# Patient Record
Sex: FEMALE | Race: WHITE
[De-identification: names, ages, dates, MRNs, and addresses within clinical notes are randomized per-mention and may not be internally consistent; named-entity substitution may affect disease eponyms.]

---

## 2017-01-19 ENCOUNTER — HOSPITAL ENCOUNTER (OUTPATIENT)
Dept: HOSPITAL 61 - PCVCCLINIC | Age: 57
Discharge: HOME | End: 2017-01-19
Attending: RADIOLOGY
Payer: MEDICARE

## 2017-01-19 DIAGNOSIS — E11.9: ICD-10-CM

## 2017-01-19 DIAGNOSIS — I25.5: ICD-10-CM

## 2017-01-19 DIAGNOSIS — I25.10: ICD-10-CM

## 2017-01-19 DIAGNOSIS — I48.91: ICD-10-CM

## 2017-01-19 DIAGNOSIS — I10: ICD-10-CM

## 2017-01-19 DIAGNOSIS — I73.9: Primary | ICD-10-CM

## 2017-01-19 DIAGNOSIS — E78.00: ICD-10-CM

## 2017-01-19 PROCEDURE — G0463 HOSPITAL OUTPT CLINIC VISIT: HCPCS

## 2017-01-26 ENCOUNTER — HOSPITAL ENCOUNTER (OUTPATIENT)
Dept: HOSPITAL 61 - PCVCINTER | Age: 57
Discharge: HOME | End: 2017-01-26
Attending: RADIOLOGY
Payer: MEDICARE

## 2017-01-26 DIAGNOSIS — E11.9: ICD-10-CM

## 2017-01-26 DIAGNOSIS — I70.92: ICD-10-CM

## 2017-01-26 DIAGNOSIS — E78.00: ICD-10-CM

## 2017-01-26 DIAGNOSIS — I25.5: ICD-10-CM

## 2017-01-26 DIAGNOSIS — I48.91: ICD-10-CM

## 2017-01-26 DIAGNOSIS — I70.0: ICD-10-CM

## 2017-01-26 DIAGNOSIS — I70.1: ICD-10-CM

## 2017-01-26 DIAGNOSIS — I25.10: ICD-10-CM

## 2017-01-26 DIAGNOSIS — F17.200: ICD-10-CM

## 2017-01-26 DIAGNOSIS — I70.213: Primary | ICD-10-CM

## 2017-01-26 DIAGNOSIS — I10: ICD-10-CM

## 2017-01-26 DIAGNOSIS — I15.0: ICD-10-CM

## 2017-01-26 PROCEDURE — C1894 INTRO/SHEATH, NON-LASER: HCPCS

## 2017-01-26 PROCEDURE — 75716 ARTERY X-RAYS ARMS/LEGS: CPT

## 2017-01-26 PROCEDURE — 36246 INS CATH ABD/L-EXT ART 2ND: CPT

## 2017-01-26 PROCEDURE — 36252 INS CATH REN ART 1ST BILAT: CPT

## 2017-01-26 PROCEDURE — 76937 US GUIDE VASCULAR ACCESS: CPT

## 2017-01-26 PROCEDURE — C1769 GUIDE WIRE: HCPCS

## 2017-01-26 PROCEDURE — C1760 CLOSURE DEV, VASC: HCPCS

## 2017-03-03 ENCOUNTER — HOSPITAL ENCOUNTER (OUTPATIENT)
Dept: HOSPITAL 61 - PCVCIMAG | Age: 57
Discharge: HOME | End: 2017-03-03
Attending: RADIOLOGY
Payer: MEDICARE

## 2017-03-03 DIAGNOSIS — E78.00: ICD-10-CM

## 2017-03-03 DIAGNOSIS — I70.211: Primary | ICD-10-CM

## 2017-03-03 DIAGNOSIS — I25.5: ICD-10-CM

## 2017-03-03 DIAGNOSIS — E11.9: ICD-10-CM

## 2017-03-03 DIAGNOSIS — I25.10: ICD-10-CM

## 2017-03-03 DIAGNOSIS — I48.91: ICD-10-CM

## 2017-03-03 DIAGNOSIS — I10: ICD-10-CM

## 2017-03-03 PROCEDURE — G0463 HOSPITAL OUTPT CLINIC VISIT: HCPCS

## 2017-03-03 PROCEDURE — 93926 LOWER EXTREMITY STUDY: CPT

## 2017-08-01 ENCOUNTER — HOSPITAL ENCOUNTER (OUTPATIENT)
Dept: HOSPITAL 61 - PCVCIMAG | Age: 57
Discharge: HOME | End: 2017-08-01
Attending: RADIOLOGY
Payer: MEDICARE

## 2017-08-01 DIAGNOSIS — Z95.1: ICD-10-CM

## 2017-08-01 DIAGNOSIS — I25.5: ICD-10-CM

## 2017-08-01 DIAGNOSIS — Z72.0: ICD-10-CM

## 2017-08-01 DIAGNOSIS — M19.90: ICD-10-CM

## 2017-08-01 DIAGNOSIS — Z79.4: ICD-10-CM

## 2017-08-01 DIAGNOSIS — Z79.82: ICD-10-CM

## 2017-08-01 DIAGNOSIS — E78.00: ICD-10-CM

## 2017-08-01 DIAGNOSIS — I48.91: ICD-10-CM

## 2017-08-01 DIAGNOSIS — I08.3: Primary | ICD-10-CM

## 2017-08-01 DIAGNOSIS — J44.9: ICD-10-CM

## 2017-08-01 DIAGNOSIS — E11.9: ICD-10-CM

## 2017-08-01 DIAGNOSIS — Z88.0: ICD-10-CM

## 2017-08-01 DIAGNOSIS — I73.9: ICD-10-CM

## 2017-08-01 DIAGNOSIS — I25.2: ICD-10-CM

## 2017-08-01 PROCEDURE — G0463 HOSPITAL OUTPT CLINIC VISIT: HCPCS

## 2017-08-01 PROCEDURE — 93306 TTE W/DOPPLER COMPLETE: CPT

## 2017-08-01 PROCEDURE — 93005 ELECTROCARDIOGRAM TRACING: CPT

## 2017-08-01 PROCEDURE — 80061 LIPID PANEL: CPT

## 2017-08-01 NOTE — PCVCIMAG
--------------- APPROVED REPORT --------------





Study performed:  08/01/2017 13:04:01



EXAM: Comprehensive 2D, Doppler, and color-flow 

Echocardiogram

Patient Location: Echo lab

Status:  routine



Other Information 

Study Quality: Adequate

Technically limited study due to  lung disease.



Risk Factors: 

Cardiac Risk Factors:  SOB, Smoking, 

HTN



Indications

COPD

CAD

Ischemic Cardiomyopathy



2D Dimensions

LVEF(%):  32.33 (&gt;50%)

IVSd:  9.68 (7-11mm)

LVDd:  50.74 mm

PWd:  6.30 (7-11mm)

LVDs:  42.94 (25-40mm)

Left Atrium:  50.49 (27-40mm)

Aortic Root:  28.18 mm

LV Single Plane 4CH:  57.52 %

LV Single Plane 2CH:  54.88 %Alonzo's LVEF:  56.20 %

Biplane EF:  59.0 %



Volumes

Left Atrial Volume (Systole)

Single Plane 4CH:  67.80 mLSingle Plane 2CH:  71.55 mL

LA ESV Index:  48.00 mL/m2



Aortic Valve

AoV Peak Ric.:  2.01 m/s

AO Peak Gr.:  16.47 mmHgLVOT Max PG:  3.90 mmHg

LVOT Max V:  0.99 m/s



Mitral Valve

E/A Ratio:  0.9

MV Decel. Time:  194.96 ms

MV E Max Ric.:  1.30 m/s

MV A Ric.:  1.44 m/s

MV PHT:  56.54 ms

MVA (PHT):  3.42 cm2

IVRT:  93.43 ms



Pulmonary Valve

PV Peak Ric.:  1.27 m/sPV Peak Gr.:  6.46 mmHg



Pulmonary Vein

P Vein S:    0.73 m/sP Vein A:  0.27 m/s

P Vein D:   0.92 m/sP Vein A Dur.:  100.3 msec

P Vein S/D Ratio:  0.79



Tricuspid Valve

TR Peak Ric.:  3.27 m/s

TR Peak Gr.:  42.71 mmHg



Left Ventricle

The left ventricle is normal size. There is normal LV segmental wall 

motion. There is normal left ventricular wall thickness. Left 

ventricular systolic function is normal. Fixed basal inferior 

akinesis from prior MI. LVEF is 50% Grade I - abnormal relaxation 

pattern.



Right Ventricle

The right ventricle is normal size. The right ventricular systolic 

function is normal.



Atria

Left atrium is moderate to severely dilated. Right atrium is 

moderately dilated.



Aortic Valve

The aortic valve is normal in structure. Mild aortic regurgitation. 

There is no aortic valvular stenosis.



Mitral Valve

Redundant, calcified mitral valve leaflets. Moderate eccentric mitral 

regurgitation. has incr from last exam No evidence of mitral valve 

stenosis.



Tricuspid Valve

The tricuspid valve is normal in structure. Moderate tricuspid 

regurgitation with PAP of 53 mmHg.



Pulmonic Valve

The pulmonary valve is normal in structure. There is no pulmonic 

valvular regurgitation.



Great Vessels

The aortic root is normal in size. IVC is normal in size and 

collapses with &gt;50% inspiration



Pericardium

There is no pericardial effusion.



&lt;Conclusion&gt;

There is normal left ventricular wall thickness.

Left ventricular systolic function is normal. Fixed basal inferior 

akinesis from prior MI.

LVEF is 50%

Grade I - abnormal relaxation pattern.

The right ventricle is normal size.

Left atrium is moderate to severely dilated.

Right atrium is moderately dilated.

Mild aortic regurgitation.

Redundant, calcified mitral valve leaflets.poss chordal 

rupture

Moderate eccentric mitral regurgitation.

Moderate eccentric mitral regurgitation. has incr from last 

exam

There is no pericardial effusion.

rec SAUL

## 2017-08-02 ENCOUNTER — HOSPITAL ENCOUNTER (OUTPATIENT)
Dept: HOSPITAL 61 - PCVCINTER | Age: 57
Discharge: HOME | End: 2017-08-02
Attending: INTERNAL MEDICINE
Payer: MEDICARE

## 2017-08-02 DIAGNOSIS — I73.9: ICD-10-CM

## 2017-08-02 DIAGNOSIS — I25.10: Primary | ICD-10-CM

## 2017-08-02 DIAGNOSIS — E11.9: ICD-10-CM

## 2017-08-02 DIAGNOSIS — I34.0: ICD-10-CM

## 2017-08-02 DIAGNOSIS — I25.5: ICD-10-CM

## 2017-08-02 DIAGNOSIS — E78.00: ICD-10-CM

## 2017-08-02 DIAGNOSIS — E78.5: ICD-10-CM

## 2017-08-02 PROCEDURE — C1769 GUIDE WIRE: HCPCS

## 2017-08-02 PROCEDURE — 93312 ECHO TRANSESOPHAGEAL: CPT

## 2017-08-02 PROCEDURE — 93325 DOPPLER ECHO COLOR FLOW MAPG: CPT

## 2017-08-02 PROCEDURE — 93460 R&L HRT ART/VENTRICLE ANGIO: CPT

## 2017-08-02 PROCEDURE — 99152 MOD SED SAME PHYS/QHP 5/>YRS: CPT

## 2017-08-02 PROCEDURE — C1894 INTRO/SHEATH, NON-LASER: HCPCS

## 2017-08-02 PROCEDURE — 99153 MOD SED SAME PHYS/QHP EA: CPT

## 2017-08-02 PROCEDURE — C1751 CATH, INF, PER/CENT/MIDLINE: HCPCS

## 2017-08-02 NOTE — PCVCIMAG
--------------- APPROVED REPORT --------------





Study performed:  08/02/2017 11:06:23



EXAM: Comprehensive 2D, Doppler, and color-flow 

Echocardiogram

Patient Location: Angio

Room #:  1

Status:  routine



Other Information 

Study Quality: Good



Indications

Mitral Valve Disease 

Mitral Regurgitation, CAD, Ischemic Cardiomyopathy



Procedure

After obtaining informed consent, patient underwent transesophageal 

echo in the [facility]. 

Type of Sedation : Conscious Sedation

Sedation was administered by Taylor Barrientos RN. 

Sedation was achieved intravenously with:  Versed (4)    Fentanyl 

(100)    

Transesophageal probe was inserted and advanced into esophagus 

without difficulty by Jean Marcano MD.

Echo enhancement indication: R/O Septal defect.

Echo enhancement agent administered: Agitated Saline

The SAUL was performed without complications. 

Throughout the procedure, the blood pressure, pulse oximetry, cardiac 

rhythm, and rate were monitored.

The patient tolerated the procedure without adverse effects. Recovery 

from conscious sedation was uneventful and vital signs were 

stable.



Left Ventricle

The left ventricle is normal size. Inferior and inferoseptal 

hypokinesis There is normal left ventricular wall thickness. Left 

ventricular systolic function is normal. LVEF is 50-55%.



Right Ventricle

The right ventricle is normal size. The right ventricular systolic 

function is normal.



Atria

The left atrium is enlarged Interatrial septum is intact without 

evidence of ASD or PFO. The right atrium size is normal.



Aortic Valve

The aortic valve is trileaflet, mildly sclerotic Mild aortic 

regurgitation is present. There is no aortic valvular 

stenosis.



Mitral Valve

Tethered posterior mitral leaflet with malcoaptation. Small posterior 

leaflet disrupted segment with associated very eccentric, moderately 

severe mitral insufficiency No evidence of mitral valve 

stenosis.



Tricuspid Valve

The tricuspid valve is normal in structure. There is no tricuspid 

valve regurgitation noted.



Pulmonic Valve

The pulmonary valve is normal in structure. There is no pulmonic 

valvular regurgitation.



Great Vessels

The aortic root is normal in size.



Pericardium

There is no pericardial effusion.



&lt;Conclusion&gt;

Left ventricular systolic function is normal.

LVEF is 50-55%. Basal inferior and inferoseptal hypokinesis

The left atrium is enlarged.  No masses or clots in left atrium or 

appendage.

No shunting by contrast bubble injection

The aortic valve is trileaflet, mildly sclerotic, mild aortic 

regurgitation, no stenosis

Tethered posterior mitral leaflet with malcoaptation.  Small 

posterior leaflet disrupted segment with associated very eccentric, 

moderately severe mitral insufficiency

There is no pericardial effusion.

## 2017-08-03 NOTE — PCVCINTER
--------------- APPROVED REPORT --------------





Patient Details

Patient Status: Out-Patient                  Room #: 1

The patient is a 56 year-old female



Event Personnel

Josselin Broderick RT(R)(VI), Courtney Jacobs RT(R), Taylor Barrientos RN



Risk Factors

Dysplipidemia , Peripheral Vascular Disease, Chronic Lung 

DiseaseHypercholesterolemia, Diabetes Last Creatanine 0.7Tobacco 

History ()



Procedure Narrative

The patient was brought electively to the Cardiac Catheterization 

Laboratory and was prepped and draped in a sterile manner. The right 

femoral was infiltrated with 1% Lidocaine subcutaneous anesthesia. A 

Right Heart Catheterization was performed with a 7 Fr. Norwood-Skyler 

catheter and pressure were recorded. Cardiac outputs were obtained by 

the Thermal Dilution method. A 6 fr sheath was inserted into the 

right femoral artery. Coronary angiography was performed using 

coronary diagnostic catheters. The right coronary system was accessed 

and visualized with a JR4 Diagnostic catheter. The left coronary 

system was accessed and visualized with a JL4 Diagnostic catheter. 

The left ventricle was accessed and visualized with a PIGTAIL 

Diagnostic catheter. Left ventriculogram was performed in DELGADO 

projection. An aortogram of the abdominal aorta was performed. 

Pre-demployment femoral angiogram was performed . Closure device was 

deployed with a 6 Fr Mynx. Hemostasis was obtained with manual 

pressure following sheath removal without any complications. The 

patient tolerated the procedure well and there were no complications 

associated with the procedure. There was no hematoma.



Fluoro Time: 4.2 minutes

Dose: DAP 2919.5 cGycm2 316 mGy

Contrast Type and Amount: OMNI 350 95ML



Hemodynamics

The right atrial mean pressure is 15/15 (15) mmHg. The right 

ventricular pressure is 43/27 (25) mmHg. The pulmonary artery 

pressure is 40/31 mmHg with a mean of 35 mmHg. The mean pulmonary 

capillary wedge pressure is 20 mmHg. The aortic pressure is 122/76 

mmHg with a mean of 100 mmHg. The left ventricular pressure is 128/-4 

mmHg with a mean of 8 mmHg. The cardiac output using thermo method is 

4.07 L/min. The cardiac index using thermo method is 2.50 

L/min/m2.



Conclusion

#1 normal left ventricular size with inferior basilar hypokinesis and 

3+ mitral regurgitation EF 50-55%

#2 abdominal aorta intact no aneurysm single renal arteries and iliac 

system mild plaquing no occlusive disease

#3 left main long and free of disease giving rise to LAD and a 

circumflex that occludes

#4 nondominant circumflex with mild disease

#5 proximal LAD closure.

#6 LIMA to LAD intact filling the LAD diffusely diseased around the 

apex

#7 diagonal branch which fills proximal to LAD occlusion. Is mildly 

diseased

#8 dominant right is occluded previous multiple stents occluded the 

PDA and PIPER is still needed diffuse septal perforators from the left 

system and the LAD.

#9 successful right heart catheterization with moderate elevations in 

pulmonary artery pressure and mean pulmonary capillary wedge 

pressure

## 2017-10-05 ENCOUNTER — HOSPITAL ENCOUNTER (OUTPATIENT)
Dept: HOSPITAL 61 - PCVCCLINIC | Age: 57
Discharge: HOME | End: 2017-10-05
Attending: INTERNAL MEDICINE
Payer: MEDICARE

## 2017-10-05 DIAGNOSIS — I25.10: Primary | ICD-10-CM

## 2017-10-05 DIAGNOSIS — I10: ICD-10-CM

## 2017-10-05 DIAGNOSIS — I73.9: ICD-10-CM

## 2017-10-05 DIAGNOSIS — I34.0: ICD-10-CM

## 2017-10-05 DIAGNOSIS — Z95.1: ICD-10-CM

## 2017-10-05 DIAGNOSIS — Z79.899: ICD-10-CM

## 2017-10-05 DIAGNOSIS — E78.00: ICD-10-CM

## 2017-10-05 DIAGNOSIS — E11.9: ICD-10-CM

## 2017-10-05 DIAGNOSIS — M19.90: ICD-10-CM

## 2017-10-05 DIAGNOSIS — Z72.0: ICD-10-CM

## 2017-10-05 DIAGNOSIS — Z79.4: ICD-10-CM

## 2017-10-05 DIAGNOSIS — Z88.0: ICD-10-CM

## 2017-10-05 PROCEDURE — G0463 HOSPITAL OUTPT CLINIC VISIT: HCPCS

## 2017-10-24 ENCOUNTER — HOSPITAL ENCOUNTER (OUTPATIENT)
Dept: HOSPITAL 61 - PCVCCLINIC | Age: 57
Discharge: HOME | End: 2017-10-24
Attending: INTERNAL MEDICINE
Payer: MEDICARE

## 2017-10-24 DIAGNOSIS — Z79.82: ICD-10-CM

## 2017-10-24 DIAGNOSIS — R94.31: ICD-10-CM

## 2017-10-24 DIAGNOSIS — I34.0: ICD-10-CM

## 2017-10-24 DIAGNOSIS — E78.00: ICD-10-CM

## 2017-10-24 DIAGNOSIS — I10: ICD-10-CM

## 2017-10-24 DIAGNOSIS — I51.7: ICD-10-CM

## 2017-10-24 DIAGNOSIS — F17.200: ICD-10-CM

## 2017-10-24 DIAGNOSIS — Z79.899: ICD-10-CM

## 2017-10-24 DIAGNOSIS — I25.810: Primary | ICD-10-CM

## 2017-10-24 DIAGNOSIS — I48.0: ICD-10-CM

## 2017-10-24 PROCEDURE — G0463 HOSPITAL OUTPT CLINIC VISIT: HCPCS

## 2017-10-24 PROCEDURE — 80061 LIPID PANEL: CPT

## 2017-10-24 PROCEDURE — 93005 ELECTROCARDIOGRAM TRACING: CPT

## 2018-02-27 ENCOUNTER — HOSPITAL ENCOUNTER (OUTPATIENT)
Dept: HOSPITAL 61 - PCVCIMAG | Age: 58
Discharge: HOME | End: 2018-02-27
Attending: INTERNAL MEDICINE
Payer: MEDICARE

## 2018-02-27 DIAGNOSIS — I25.5: ICD-10-CM

## 2018-02-27 DIAGNOSIS — I10: ICD-10-CM

## 2018-02-27 DIAGNOSIS — Z79.4: ICD-10-CM

## 2018-02-27 DIAGNOSIS — I25.10: ICD-10-CM

## 2018-02-27 DIAGNOSIS — Z95.1: ICD-10-CM

## 2018-02-27 DIAGNOSIS — I73.9: ICD-10-CM

## 2018-02-27 DIAGNOSIS — E11.9: ICD-10-CM

## 2018-02-27 DIAGNOSIS — E78.00: ICD-10-CM

## 2018-02-27 DIAGNOSIS — Z79.899: ICD-10-CM

## 2018-02-27 DIAGNOSIS — Z79.82: ICD-10-CM

## 2018-02-27 DIAGNOSIS — I08.8: Primary | ICD-10-CM

## 2018-02-27 DIAGNOSIS — I48.0: ICD-10-CM

## 2018-02-27 DIAGNOSIS — F17.200: ICD-10-CM

## 2018-02-27 PROCEDURE — 93005 ELECTROCARDIOGRAM TRACING: CPT

## 2018-02-27 PROCEDURE — 93306 TTE W/DOPPLER COMPLETE: CPT

## 2018-02-27 PROCEDURE — 80061 LIPID PANEL: CPT

## 2019-02-21 ENCOUNTER — HOSPITAL ENCOUNTER (OUTPATIENT)
Dept: HOSPITAL 61 - PCVCCLINIC | Age: 59
Discharge: HOME | End: 2019-02-21
Attending: INTERNAL MEDICINE
Payer: MEDICARE

## 2019-02-21 DIAGNOSIS — Z79.4: ICD-10-CM

## 2019-02-21 DIAGNOSIS — F17.200: ICD-10-CM

## 2019-02-21 DIAGNOSIS — R91.8: ICD-10-CM

## 2019-02-21 DIAGNOSIS — E78.01: ICD-10-CM

## 2019-02-21 DIAGNOSIS — Z79.82: ICD-10-CM

## 2019-02-21 DIAGNOSIS — I48.0: ICD-10-CM

## 2019-02-21 DIAGNOSIS — I25.10: ICD-10-CM

## 2019-02-21 DIAGNOSIS — I10: ICD-10-CM

## 2019-02-21 DIAGNOSIS — I73.9: ICD-10-CM

## 2019-02-21 DIAGNOSIS — Z95.1: ICD-10-CM

## 2019-02-21 DIAGNOSIS — E11.9: ICD-10-CM

## 2019-02-21 DIAGNOSIS — J44.9: ICD-10-CM

## 2019-02-21 DIAGNOSIS — Z01.810: Primary | ICD-10-CM

## 2019-02-21 DIAGNOSIS — I08.0: ICD-10-CM

## 2019-02-21 DIAGNOSIS — I25.5: ICD-10-CM

## 2019-02-21 PROCEDURE — 36415 COLL VENOUS BLD VENIPUNCTURE: CPT

## 2019-02-21 PROCEDURE — 80061 LIPID PANEL: CPT

## 2019-02-21 PROCEDURE — 93005 ELECTROCARDIOGRAM TRACING: CPT

## 2019-02-21 PROCEDURE — G0463 HOSPITAL OUTPT CLINIC VISIT: HCPCS

## 2019-02-21 PROCEDURE — 93306 TTE W/DOPPLER COMPLETE: CPT

## 2019-04-03 ENCOUNTER — HOSPITAL ENCOUNTER (OUTPATIENT)
Dept: HOSPITAL 61 - PCVCCLINIC | Age: 59
Discharge: HOME | End: 2019-04-03
Attending: INTERNAL MEDICINE
Payer: MEDICARE

## 2019-04-03 DIAGNOSIS — Z79.82: ICD-10-CM

## 2019-04-03 DIAGNOSIS — Z95.1: ICD-10-CM

## 2019-04-03 DIAGNOSIS — R91.8: ICD-10-CM

## 2019-04-03 DIAGNOSIS — Z90.2: ICD-10-CM

## 2019-04-03 DIAGNOSIS — I73.9: ICD-10-CM

## 2019-04-03 DIAGNOSIS — I25.10: Primary | ICD-10-CM

## 2019-04-03 DIAGNOSIS — Z91.040: ICD-10-CM

## 2019-04-03 DIAGNOSIS — I25.5: ICD-10-CM

## 2019-04-03 DIAGNOSIS — I10: ICD-10-CM

## 2019-04-03 DIAGNOSIS — J44.9: ICD-10-CM

## 2019-04-03 DIAGNOSIS — I48.0: ICD-10-CM

## 2019-04-03 DIAGNOSIS — Z88.0: ICD-10-CM

## 2019-04-03 DIAGNOSIS — E78.00: ICD-10-CM

## 2019-04-03 DIAGNOSIS — E11.9: ICD-10-CM

## 2019-04-03 DIAGNOSIS — M19.90: ICD-10-CM

## 2019-04-03 DIAGNOSIS — I34.1: ICD-10-CM

## 2019-04-03 PROCEDURE — 80061 LIPID PANEL: CPT

## 2019-04-03 PROCEDURE — 36415 COLL VENOUS BLD VENIPUNCTURE: CPT

## 2019-04-03 PROCEDURE — 93005 ELECTROCARDIOGRAM TRACING: CPT

## 2019-04-03 PROCEDURE — G0463 HOSPITAL OUTPT CLINIC VISIT: HCPCS

## 2019-10-22 ENCOUNTER — HOSPITAL ENCOUNTER (OUTPATIENT)
Dept: HOSPITAL 61 - PCVCCLINIC | Age: 59
Discharge: HOME | End: 2019-10-22
Attending: INTERNAL MEDICINE
Payer: MEDICARE

## 2019-10-22 DIAGNOSIS — R94.31: ICD-10-CM

## 2019-10-22 DIAGNOSIS — Z79.899: ICD-10-CM

## 2019-10-22 DIAGNOSIS — I05.1: ICD-10-CM

## 2019-10-22 DIAGNOSIS — I25.10: Primary | ICD-10-CM

## 2019-10-22 DIAGNOSIS — F17.200: ICD-10-CM

## 2019-10-22 DIAGNOSIS — I73.9: ICD-10-CM

## 2019-10-22 DIAGNOSIS — I25.5: ICD-10-CM

## 2019-10-22 DIAGNOSIS — I48.0: ICD-10-CM

## 2019-10-22 DIAGNOSIS — J44.9: ICD-10-CM

## 2019-10-22 DIAGNOSIS — E11.9: ICD-10-CM

## 2019-10-22 DIAGNOSIS — I10: ICD-10-CM

## 2019-10-22 DIAGNOSIS — Z79.82: ICD-10-CM

## 2019-10-22 PROCEDURE — 93005 ELECTROCARDIOGRAM TRACING: CPT

## 2019-10-22 PROCEDURE — G0463 HOSPITAL OUTPT CLINIC VISIT: HCPCS

## 2019-10-22 PROCEDURE — 80061 LIPID PANEL: CPT

## 2019-10-22 PROCEDURE — 36415 COLL VENOUS BLD VENIPUNCTURE: CPT

## 2019-12-03 ENCOUNTER — HOSPITAL ENCOUNTER (OUTPATIENT)
Dept: HOSPITAL 61 - PCVCIMAG | Age: 59
Discharge: HOME | End: 2019-12-03
Attending: INTERNAL MEDICINE
Payer: MEDICARE

## 2019-12-03 DIAGNOSIS — J44.9: ICD-10-CM

## 2019-12-03 DIAGNOSIS — I34.0: ICD-10-CM

## 2019-12-03 DIAGNOSIS — Z79.01: ICD-10-CM

## 2019-12-03 DIAGNOSIS — Z95.1: ICD-10-CM

## 2019-12-03 DIAGNOSIS — I48.91: ICD-10-CM

## 2019-12-03 DIAGNOSIS — Z79.82: ICD-10-CM

## 2019-12-03 DIAGNOSIS — I34.1: ICD-10-CM

## 2019-12-03 DIAGNOSIS — I10: ICD-10-CM

## 2019-12-03 DIAGNOSIS — I73.9: ICD-10-CM

## 2019-12-03 DIAGNOSIS — Z79.4: ICD-10-CM

## 2019-12-03 DIAGNOSIS — E78.00: ICD-10-CM

## 2019-12-03 DIAGNOSIS — Z72.0: ICD-10-CM

## 2019-12-03 DIAGNOSIS — I25.5: Primary | ICD-10-CM

## 2019-12-03 DIAGNOSIS — Z82.49: ICD-10-CM

## 2019-12-03 DIAGNOSIS — E11.9: ICD-10-CM

## 2019-12-03 PROCEDURE — G0463 HOSPITAL OUTPT CLINIC VISIT: HCPCS

## 2019-12-03 PROCEDURE — 93017 CV STRESS TEST TRACING ONLY: CPT

## 2019-12-03 PROCEDURE — 78452 HT MUSCLE IMAGE SPECT MULT: CPT

## 2019-12-03 PROCEDURE — 80061 LIPID PANEL: CPT

## 2019-12-03 PROCEDURE — A9500 TC99M SESTAMIBI: HCPCS

## 2019-12-03 PROCEDURE — 36415 COLL VENOUS BLD VENIPUNCTURE: CPT

## 2019-12-03 NOTE — PCVCIMAG
--------------- APPROVED REPORT --------------





Imaging Protocol: Rest Tc-99m/Stress Tc-99m 1 day

Study performed:  12/03/2019 09:42:37



Indication: Afib, Ischemic Cardiomyopathy

Patient Location: Out-Patient

Stress Nurse: Hanna Lindquist RN

NM Tech:Epifanio Rico NMTCB



Ht: 5 ft 4 in Wt: 168 lbs BSA:  1.82 m2

HR: 74 bpm                      BP: 177/77 mmHg         BMI:  

28.8

Rhythm:  Sinus Rhythm



Medical History

Medical History: Age, HTN, PVD, Afib, Smoker, DM Insulin

Medications: ASA, Carvedilol, Ramipril, Crestor, Betapace, 

NTG

Allergies: Latex, PCN

Previous Cardiac Procedures: CABG



Resting Data

Rest SPECT myocardial perfusion imaging was performed in supine 

position 45 minutes following the intravenous injection of 11.9 mCi 

of Tc-99m Sestamibi.

Time of rest injection: 0835     Date: 12/03/2019

Administration Route: IV

Administration Site: Right AC



Pharmacologic Stress

Pharmacologic stress test was performed by injecting Regadenoson 0.4 

mg IV push over 10-15 seconds immediately followed by the intravenous 

injection of 33.8 mCi of Tc-99m Sestamibi.

Time of stress injection: 1010     Date: 12/03/2019

Administration Route: IV

Administration Site: Right AC

Gated Stress SPECT was performed 45 minutes after stress 

injection.

The images were gated to evaluate regional wall motion and calculate 

left ventricular ejection fraction. 



Stress Test Details

Stress Test:  Pharmacologic stress testing performed using 0.4 mg of 

regadenoson per 5 mL given IV over 10 seconds.

  Reason for pharmacologic stress test: Lung status.



HRMax Heart Rate (APMHR): 163 bpm 

Resting HR:            74 bpmTarget HR (85% APMHR): 138 bpm

Max HR Achieved:  90 bpm

% of APMHR:         55

Recovery HR:            82 bpm



BP

Resting BP:  177/77 mmHg

Max BP:       153/70 mmHg

Recovery BP:       139/67 mmHg

ECG

Resting ECG:  Sinus Rhythm

Stress ECG:     Sinus Rhythm

Arrhythmia:    PVC's

Recovery ECG: Sinus Rhythm



Clinical

Reason for Termination: Completed protocol

Stress Symptoms: Dyspnea

Symptoms resolved during recovery.



Stress ECG Conclusion

ECG: Non-ischemic



Study Quality

Study: Good



Study Data

Post stress, the left ventricular ejection was 56%..

SSS: 2

SRS: 10

SDS: 0

TID = 0.94.



Perfusion

No evidence of stress induced ischemia. 

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 

left ventricle with no michael-infarct ischemia.



Wall Motion

Normal left ventricular size and function. There is a medium area of 

akinesis in the basal and mid segment of the inferior wall which is 

seen on the stress images as well as the resting images.



Nuclear Conclusion

No evidence of stress induced ischemia. 

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 

left ventricle with no michael-infarct ischemia.

Post stress, the left ventricular ejection was 56%. 

No change since prior study dated December 2015.



Interpreted by:  Octaviano Mir MD

Electronically Approved: 12/03/2019 

11:31:19



<Conclusion>

ECG: Non-ischemic

## 2023-06-03 NOTE — PCVCIMAG
--------------- APPROVED REPORT --------------





Study performed:  2019 16:07:29



EXAM: Comprehensive 2D, Doppler, and color-flow 

Echocardiogram

Patient Location: Echo lab

Status:  routine



BSA:         1.87

HR: 80 bpmBP:          134/76 mmHg

Rhythm: NSR



Other Information 

Study Quality: Adequate



Risk Factors: 

Cardiac Risk Factors:  HTN, Hyperlipidemia, DM, 

Smoking



Indications

Diabetes

CAD

Hypertension/HDD

CABG, Ischemic cardiomyopathy, Pre op Lung mass



2D Dimensions

IVSd:  9.76 (7-11mm)LVOT Diam:  18.97 (18-24mm) 

LVDd:  51.54 mm

PWd:  8.82 (7-11mm)Ascending Ao:  29.26 (22-36mm)

LVDs:  38.57 (25-40mm)

Left Atrium:  44.32 (27-40mm)

Aortic Root:  22.73 mm

LV Single Plane 4CH:  46.34 %

LV Single Plane 2CH:  46.50 %



Volumes

Left Atrial Volume (Systole)

Single Plane 4CH:  47.93 mLSingle Plane 2CH:  38.58 mL

LA ESV Index:  24.00 mL/m2



Aortic Valve

AoV Peak Ric.:  1.51 m/s

AO Peak Gr.:  9.15 mmHgLVOT Max P.27 mmHg

LVOT Max V:  0.53 m/s

RINA Vmax: 0.99 cm2

AI Vmax:  4.17 m/s

AI Woodward:  5.56 m/s2

AI PHT:  217.21 ms



Mitral Valve

E/A Ratio:  0.9

MV Decel. Time:  161.03 ms

MV E Max Ric.:  1.04 m/s

MV A Ric.:  1.13 m/s



TDI

E/Lateral E':  20.80E/Medial E':  10.40

Medial E' Ric.:  0.10 m/s

Lateral E' Ric.:  0.05 m/s



Pulmonary Valve

PV Peak Gr.:  3.44 mmHg



Pulmonary Vein

P Vein S:    0.74 m/sP Vein A:  0.25 m/s

P Vein D:   0.57 m/sP Vein A Dur.:  86.5 msec

P Vein S/D Ratio:  1.30



Tricuspid Valve

TR Peak Ric.:  2.40 m/s

TR Peak Gr.:  23.02 mmHg



Left Ventricle

The left ventricle is normal size. Inferior wall hypokinesis. There 

is normal left ventricular wall thickness. Left ventricular systolic 

function is mildly decreased. The left ventricular ejection fraction 

is within the normal range. LVEF is 40%. The left ventricular 

diastolic function is normal.



Right Ventricle

The right ventricle is normal size. The right ventricular systolic 

function is normal.



Atria

Left atrium is moderately dilated. The right atrium size is 

normal.



Aortic Valve

The aortic valve is normal in structure. Mild aortic regurgitation. 

There is no aortic valvular stenosis.



Mitral Valve

Mitral Valve Prolapse. Ecentric mild to moderate mitral 

regurgitation. No evidence of mitral valve stenosis.



Tricuspid Valve

The tricuspid valve is normal in structure. Trace tricuspid 

regurgitation. Pulmonary artery pressure is 30mmHg.



Pulmonic Valve

The pulmonary valve is normal in structure. There is no pulmonic 

valvular regurgitation.



Great Vessels

The aortic root is normal in size. IVC is normal in size and 

collapses >50% with inspiration.



Pericardium

There is no pericardial effusion.



<Conclusion>

The left ventricle is normal size.

LVEF is 40%.

Inferior wall hypokinesis.

The left ventricular diastolic function is normal.

The right ventricle is normal size.

Left atrium is moderately dilated.

Mild aortic regurgitation.

Mitral Valve Prolapse.

Ecentric mild to moderate  mitral regurgitation.

Trace tricuspid regurgitation. Pulmonary artery pressure is 

30mmHg.

The aortic root is normal in size.

There is no pericardial effusion. Patient requests all Lab, Cardiology, and Radiology Results on their Discharge Instructions